# Patient Record
Sex: FEMALE | Race: WHITE | Employment: FULL TIME | URBAN - METROPOLITAN AREA
[De-identification: names, ages, dates, MRNs, and addresses within clinical notes are randomized per-mention and may not be internally consistent; named-entity substitution may affect disease eponyms.]

---

## 2017-03-17 PROCEDURE — 87591 N.GONORRHOEAE DNA AMP PROB: CPT | Performed by: OBSTETRICS & GYNECOLOGY

## 2017-03-17 PROCEDURE — 87491 CHLMYD TRACH DNA AMP PROBE: CPT | Performed by: OBSTETRICS & GYNECOLOGY

## 2017-11-21 PROCEDURE — 87252 VIRUS INOCULATION TISSUE: CPT | Performed by: INTERNAL MEDICINE

## 2018-02-07 ENCOUNTER — HOSPITAL ENCOUNTER (EMERGENCY)
Facility: HOSPITAL | Age: 38
Discharge: HOME | End: 2018-02-07
Attending: EMERGENCY MEDICINE
Payer: COMMERCIAL

## 2018-02-07 ENCOUNTER — APPOINTMENT (OUTPATIENT)
Dept: GENERAL RADIOLOGY | Facility: HOSPITAL | Age: 38
End: 2018-02-07
Attending: EMERGENCY MEDICINE
Payer: COMMERCIAL

## 2018-02-07 VITALS
DIASTOLIC BLOOD PRESSURE: 69 MMHG | HEART RATE: 68 BPM | SYSTOLIC BLOOD PRESSURE: 108 MMHG | WEIGHT: 150 LBS | TEMPERATURE: 98 F | HEIGHT: 67 IN | OXYGEN SATURATION: 98 % | BODY MASS INDEX: 23.54 KG/M2 | RESPIRATION RATE: 17 BRPM

## 2018-02-07 DIAGNOSIS — M62.838 SPASM OF MUSCLE: ICD-10-CM

## 2018-02-07 DIAGNOSIS — S29.019A STRAIN OF THORACIC REGION, INITIAL ENCOUNTER: Primary | ICD-10-CM

## 2018-02-07 PROCEDURE — 96372 THER/PROPH/DIAG INJ SC/IM: CPT

## 2018-02-07 PROCEDURE — 71046 X-RAY EXAM CHEST 2 VIEWS: CPT | Performed by: EMERGENCY MEDICINE

## 2018-02-07 PROCEDURE — 99283 EMERGENCY DEPT VISIT LOW MDM: CPT

## 2018-02-07 RX ORDER — KETOROLAC TROMETHAMINE 30 MG/ML
60 INJECTION, SOLUTION INTRAMUSCULAR; INTRAVENOUS ONCE
Status: COMPLETED | OUTPATIENT
Start: 2018-02-07 | End: 2018-02-07

## 2018-02-07 RX ORDER — DIAZEPAM 5 MG/1
5 TABLET ORAL ONCE
Status: COMPLETED | OUTPATIENT
Start: 2018-02-07 | End: 2018-02-07

## 2018-02-07 RX ORDER — NAPROXEN 500 MG/1
500 TABLET ORAL 2 TIMES DAILY PRN
Qty: 20 TABLET | Refills: 0 | Status: SHIPPED | OUTPATIENT
Start: 2018-02-07 | End: 2018-04-30 | Stop reason: ALTCHOICE

## 2018-02-07 RX ORDER — DIAZEPAM 5 MG/1
5 TABLET ORAL 3 TIMES DAILY PRN
Qty: 20 TABLET | Refills: 0 | Status: SHIPPED | OUTPATIENT
Start: 2018-02-07 | End: 2018-02-17

## 2018-02-07 RX ORDER — METHYLPREDNISOLONE 4 MG/1
TABLET ORAL
Qty: 1 PACKAGE | Refills: 0 | Status: SHIPPED | OUTPATIENT
Start: 2018-02-07 | End: 2018-02-12

## 2018-02-07 NOTE — ED PROVIDER NOTES
Patient Seen in: [facility] Emergency Department    History   Patient presents with:  Back Pain (musculoskeletal)  Dyspnea SANDY SOB (respiratory)    Stated Complaint: BACK PAIN 3 WEEKS,     HPI    42-year-old female presents to the emergency departme Date   • HOSPITALIZATIONS     appendicitis at 9 yrs old   • Hypercholesterolemia     diet and exercise controlled       Past Surgical History:  No date: APPENDECTOMY      Comment: 9 yrs old  2/7/2014: EXC BACK LES SC = 3 CM      Comment: Procedure: [id number] no discomfort with deep breath   Abdominal: Soft. Bowel sounds are normal. There is no tenderness. There is no rebound. Musculoskeletal: She exhibits no edema or tenderness. Thoracic back: She exhibits decreased range of motion, pain and spasm. (5 mg total) by mouth 3 (three) times daily as needed (muscle spasm).   Qty: 20 tablet Refills: 0    methylPREDNISolone 4 MG Oral Tablet Therapy Pack  Dosepack: use as directed on packaging  Qty: 1 Package Refills: 0

## 2018-02-07 NOTE — ED INITIAL ASSESSMENT (HPI)
Pt c/o back pain for a few weeks. Today, patient states at 0400 she woke up and took Advil and used heating pad for the pain and went back to sleep. When she woke up she was getting ready for the day, put on eyeliner and had a thoracic muscle spasm.  Pt was

## 2018-04-30 PROCEDURE — 84432 ASSAY OF THYROGLOBULIN: CPT | Performed by: FAMILY MEDICINE

## 2018-04-30 PROCEDURE — 86376 MICROSOMAL ANTIBODY EACH: CPT | Performed by: FAMILY MEDICINE

## 2018-04-30 PROCEDURE — 86800 THYROGLOBULIN ANTIBODY: CPT | Performed by: FAMILY MEDICINE

## 2019-04-29 PROCEDURE — 87624 HPV HI-RISK TYP POOLED RSLT: CPT | Performed by: OBSTETRICS & GYNECOLOGY

## 2019-04-29 PROCEDURE — 88175 CYTOPATH C/V AUTO FLUID REDO: CPT | Performed by: OBSTETRICS & GYNECOLOGY

## 2019-04-30 PROCEDURE — 84432 ASSAY OF THYROGLOBULIN: CPT | Performed by: FAMILY MEDICINE

## 2019-04-30 PROCEDURE — 86800 THYROGLOBULIN ANTIBODY: CPT | Performed by: FAMILY MEDICINE

## 2019-04-30 PROCEDURE — 86376 MICROSOMAL ANTIBODY EACH: CPT | Performed by: FAMILY MEDICINE

## (undated) NOTE — LETTER
February 7, 2018    Patient: Jose L Brennan   Date of Visit: 2/7/2018       To Whom It May Concern:    Jose L Brennan was seen and treated in our emergency department on 2/7/2018.  She should not return to work until 02/09/18 may return if symptoms resolv

## (undated) NOTE — ED AVS SNAPSHOT
Lexus Rivas   MRN: [id number]    Department:  [facility] Emergency Department   Date of Visit:  2/7/2018           Disclosure     Insurance plans vary and the physician(s) referred by the ER may not be covered by your plan.  Please contact your i tell this physician (or your personal doctor if your instructions are to return to your personal doctor) about any new or lasting problems. The primary care or specialist physician will see patients referred from the [facility] Emergency Department.  Rosy Gomez